# Patient Record
Sex: FEMALE | Race: BLACK OR AFRICAN AMERICAN | NOT HISPANIC OR LATINO | Employment: UNEMPLOYED | URBAN - METROPOLITAN AREA
[De-identification: names, ages, dates, MRNs, and addresses within clinical notes are randomized per-mention and may not be internally consistent; named-entity substitution may affect disease eponyms.]

---

## 2023-12-22 ENCOUNTER — APPOINTMENT (OUTPATIENT)
Dept: RADIOLOGY | Facility: HOSPITAL | Age: 53
End: 2023-12-22
Payer: COMMERCIAL

## 2023-12-22 ENCOUNTER — HOSPITAL ENCOUNTER (EMERGENCY)
Facility: HOSPITAL | Age: 53
Discharge: HOME | End: 2023-12-22
Attending: EMERGENCY MEDICINE
Payer: COMMERCIAL

## 2023-12-22 VITALS
WEIGHT: 165 LBS | HEIGHT: 60 IN | TEMPERATURE: 97.5 F | SYSTOLIC BLOOD PRESSURE: 123 MMHG | RESPIRATION RATE: 16 BRPM | BODY MASS INDEX: 32.39 KG/M2 | DIASTOLIC BLOOD PRESSURE: 79 MMHG | HEART RATE: 68 BPM | OXYGEN SATURATION: 100 %

## 2023-12-22 DIAGNOSIS — S09.90XA CLOSED HEAD INJURY, INITIAL ENCOUNTER: Primary | ICD-10-CM

## 2023-12-22 DIAGNOSIS — R11.2 NAUSEA AND VOMITING, UNSPECIFIED VOMITING TYPE: ICD-10-CM

## 2023-12-22 DIAGNOSIS — S09.90XA HEAD INJURY, INITIAL ENCOUNTER: ICD-10-CM

## 2023-12-22 PROCEDURE — 70450 CT HEAD/BRAIN W/O DYE: CPT

## 2023-12-22 PROCEDURE — 96372 THER/PROPH/DIAG INJ SC/IM: CPT

## 2023-12-22 PROCEDURE — 2500000004 HC RX 250 GENERAL PHARMACY W/ HCPCS (ALT 636 FOR OP/ED)

## 2023-12-22 PROCEDURE — 70450 CT HEAD/BRAIN W/O DYE: CPT | Performed by: RADIOLOGY

## 2023-12-22 PROCEDURE — 99284 EMERGENCY DEPT VISIT MOD MDM: CPT | Performed by: EMERGENCY MEDICINE

## 2023-12-22 PROCEDURE — 2500000005 HC RX 250 GENERAL PHARMACY W/O HCPCS: Mod: SE

## 2023-12-22 PROCEDURE — 99284 EMERGENCY DEPT VISIT MOD MDM: CPT

## 2023-12-22 RX ORDER — ACETAMINOPHEN 325 MG/1
975 TABLET ORAL ONCE
Status: COMPLETED | OUTPATIENT
Start: 2023-12-22 | End: 2023-12-22

## 2023-12-22 RX ORDER — ONDANSETRON 4 MG/1
4 TABLET, ORALLY DISINTEGRATING ORAL ONCE
Status: COMPLETED | OUTPATIENT
Start: 2023-12-22 | End: 2023-12-22

## 2023-12-22 RX ORDER — KETOROLAC TROMETHAMINE 15 MG/ML
15 INJECTION, SOLUTION INTRAMUSCULAR; INTRAVENOUS ONCE
Status: COMPLETED | OUTPATIENT
Start: 2023-12-22 | End: 2023-12-22

## 2023-12-22 RX ORDER — ONDANSETRON 4 MG/1
4 TABLET, FILM COATED ORAL EVERY 8 HOURS PRN
Qty: 9 TABLET | Refills: 0 | Status: SHIPPED | OUTPATIENT
Start: 2023-12-22 | End: 2023-12-25

## 2023-12-22 RX ADMIN — ACETAMINOPHEN 975 MG: 325 TABLET ORAL at 22:44

## 2023-12-22 RX ADMIN — KETOROLAC TROMETHAMINE 15 MG: 15 INJECTION, SOLUTION INTRAMUSCULAR; INTRAVENOUS at 21:35

## 2023-12-22 RX ADMIN — ONDANSETRON 4 MG: 4 TABLET, ORALLY DISINTEGRATING ORAL at 21:35

## 2023-12-22 RX ADMIN — ONDANSETRON 4 MG: 4 TABLET, ORALLY DISINTEGRATING ORAL at 23:01

## 2023-12-22 ASSESSMENT — PAIN - FUNCTIONAL ASSESSMENT: PAIN_FUNCTIONAL_ASSESSMENT: 0-10

## 2023-12-22 ASSESSMENT — COLUMBIA-SUICIDE SEVERITY RATING SCALE - C-SSRS
1. IN THE PAST MONTH, HAVE YOU WISHED YOU WERE DEAD OR WISHED YOU COULD GO TO SLEEP AND NOT WAKE UP?: NO
2. HAVE YOU ACTUALLY HAD ANY THOUGHTS OF KILLING YOURSELF?: NO
6. HAVE YOU EVER DONE ANYTHING, STARTED TO DO ANYTHING, OR PREPARED TO DO ANYTHING TO END YOUR LIFE?: NO

## 2023-12-22 ASSESSMENT — PAIN SCALES - GENERAL: PAINLEVEL_OUTOF10: 8

## 2023-12-22 ASSESSMENT — PAIN DESCRIPTION - LOCATION: LOCATION: HEAD

## 2023-12-22 NOTE — ED TRIAGE NOTES
PT tripped and fell Wednesday. States she has been having dizziness, HA, blurred vision, nausea and numbness in R arm since.

## 2023-12-23 NOTE — ED PROVIDER NOTES
Emergency Department Encounter  [facility] EMERGENCY MEDICINE    Patient: Kenya Banks  MRN: [id number]  : 1970  Date of Evaluation: 2023  ED Provider: ARTIE Pierson      Chief Complaint       Chief Complaint   Patient presents with    Fall     [place]    (Location/Symptom, Timing/Onset, Context/Setting, Quality, Duration, Modifying Factors, Severity) Note limiting factors.   Limitations to History: None  Historian: Patient  Records reviewed: EMR inpatient and outpatient notes, Care Everywhere      Kenya Banks is a 53 y.o. femalewith past medical history of lupus and Sojogren'S syndrome, who presents to the emergency department complaining of headache, vomiting x 4 episodes, blurry vision and intermittent double vision s/p falling.  She reports tripped and hit forehead on a brick wall, initially had swelling, headache and dizziness, which she reports the swelling has resolved, she still expresses moderate headache with and no relief with Tylenol or Advil.  She denies loss of consciousness, loss of memory, anticoagulant use, chest pain, shortness of breath, or abdominal pain.    ROS:     Review of Systems  14 systems reviewed and otherwise acutely negative except as in the [place].          Past History     Past Medical History:   Diagnosis Date    Asthma     Lupus (CMS/HCC)     Sjogren's syndrome (CMS/HCC)      History reviewed. No pertinent surgical history.  Social History     Socioeconomic History    Marital status: Single     Spouse name: None    Number of children: None    Years of education: None    Highest education level: None   Occupational History    None   Tobacco Use    Smoking status: Never    Smokeless tobacco: Never   Substance and Sexual Activity    Alcohol use: None    Drug use: None    Sexual activity: None   Other Topics Concern    None   Social History Narrative    None     Social Determinants of Health     Financial Resource Strain: Not on file   Food  Insecurity: Not on file   Transportation Needs: Not on file   Physical Activity: Not on file   Stress: Not on file   Social Connections: Not on file   Intimate Partner Violence: Not on file   Housing Stability: Not on file       Medications/Allergies     Previous Medications    No medications on file     No Known Allergies     Physical Exam       ED Triage Vitals [12/22/23 1849]   Temp Heart Rate Resp BP   36.8 °C (98.2 °F) 84 18 (!) 144/94      SpO2 Temp src Heart Rate Source Patient Position   97 % -- -- --      BP Location FiO2 (%)     -- --         Physical Exam    GENERAL:  The patient appears nourished and normally developed. Vital signs as documented.     HEENT:  Head normocephalic, atraumatic, EOMs intact, PERRLA, Mucous membranes moist. Nares patent without copious rhinorrhea.  No lymphadenopathy.    Neck: Supple.  No meningismus.  No cervical midline or paraspinal tenderness.  No step-offs or deformities.  Trachea midline.    PULMONARY:  Lungs are clear to auscultation, without any respiratory distress. Able to speak full sentences, no accessory muscle use.    CARDIAC:   Normal rate and rhythm. No murmurs, rubs or gallops.    ABDOMEN:  Soft, non distended, non tender, BS positive x 4 quadrants, No rebound or guarding, no peritoneal signs, no CVA tenderness, no masses or organomegaly.    : External exam unremarkable, speculum exam with no discharge, no bleeding, no adnexal tenderness or masses.    MUSCULOSKELETAL:   Able to ambulate, Non edematous, with no obvious deformities. Pulses intact distal.    SKIN:   Good color, with no significant rashes.  No pallor.  No masses, lesions or open sores.    NEURO:  No obvious neurological deficits, normal sensation and strength bilaterally.  Able to follow commands, NIH 0, CN 2-12 intact.        Diagnostics   Labs:  Labs Reviewed - No data to display  Radiographs:  No orders to display       Procedures: N/A      EKG: interpreted by this  provider  Time:  Indication:  Rate:  Rhythm:  Axis:  Interval:  ST Segment:  Comparison to Prior:      Medical decision making   In brief, Kenya Banks is a 53 y.o. female who presented to the emergency department headache, vomiting x 4 episodes, blurry vision and intermittent double vision s/p falling See history above. Vitals reviewed.  Physical examination unremarkable.  NIH 0, no neurological deficits and no weakness.  Pupils are equal and reactive.  No cervical midline or paraspinal tenderness.  Differential diagnoses include SAH, concussion, fracture.  Medical work up includes CT head.  -CT head shows no evidence of hemorrhaging, masses or fractures.  Treatment plan included Zofran and Toradol.  Posttreatment assessment, patient still complaining of headache and nausea given Tylenol and Zofran.  Patient reports significant improvement to symptoms posttreatment.  Low suspicion for SAH and fractures considering patient's physical examination and negative CT scan.  Patient will be treated for concussion.  Patient will be given prescription for Zofran and educated to continue Tylenol/ibuprofen for discomfort.  Patient educated to follow-up with the concussion clinic and strict return precautions discussed.  I discussed the differential, results and discharge plan with the patient. I emphasized the importance of follow-up with the physician I referred them to in the time frame recommended. I explained reasons for the patient to return to the clinic. Questions were addressed. They understand return precautions and discharge instructions. The patient  expressed understanding.            Diagnoses as of 12/22/23 2333   Closed head injury, initial encounter   Head injury, initial encounter   Nausea and vomiting, unspecified vomiting type      Visit Vitals  BP (!) 144/94   Pulse 84   Temp 36.8 °C (98.2 °F)   Resp 18   Ht 1.524 m (5')   Wt 74.8 kg (165 lb)   SpO2 97%   BMI 32.22 kg/m²   Smoking Status Never   BSA  1.78 m²       Medications - No data to display    Plan of care discussed,  Case reviewed with Dr.Matthew Morillo      Final Impression    No diagnosis found.      DISPOSITION  Disposition: Discharge  Patient condition is: Stable    Comment: Please note this report has been produced using speech recognition software and may contain errors related to that system including errors in grammar, punctuation, and spelling, as well as words and phrases that may be inappropriate.  If there are any questions or concerns please feel free to contact the dictating provider for clarification.    ARTIE Pierson  [facility]  Emergency department     ARTIE Pierson  12/22/23 0747

## 2023-12-23 NOTE — DISCHARGE INSTRUCTIONS
May take over-the-counter Tylenol/ibuprofen for headache.    Please follow-up with the concussion clinic at [phone].  Return to the ED if experiencing persistent vomiting, change in mental status, loss of consciousness or new symptoms develop.